# Patient Record
Sex: MALE | Race: BLACK OR AFRICAN AMERICAN | NOT HISPANIC OR LATINO | Employment: FULL TIME | URBAN - METROPOLITAN AREA
[De-identification: names, ages, dates, MRNs, and addresses within clinical notes are randomized per-mention and may not be internally consistent; named-entity substitution may affect disease eponyms.]

---

## 2018-09-06 ENCOUNTER — HOSPITAL ENCOUNTER (EMERGENCY)
Facility: CLINIC | Age: 23
Discharge: HOME | End: 2018-09-06
Attending: EMERGENCY MEDICINE | Admitting: EMERGENCY MEDICINE
Payer: COMMERCIAL

## 2018-09-06 ENCOUNTER — APPOINTMENT (OUTPATIENT)
Dept: CT IMAGING | Facility: CLINIC | Age: 23
End: 2018-09-06
Attending: EMERGENCY MEDICINE
Payer: COMMERCIAL

## 2018-09-06 VITALS
SYSTOLIC BLOOD PRESSURE: 119 MMHG | RESPIRATION RATE: 14 BRPM | TEMPERATURE: 98.9 F | WEIGHT: 156 LBS | BODY MASS INDEX: 21.84 KG/M2 | OXYGEN SATURATION: 100 % | HEIGHT: 71 IN | DIASTOLIC BLOOD PRESSURE: 93 MMHG

## 2018-09-06 DIAGNOSIS — N39.0 URINARY TRACT INFECTION WITHOUT HEMATURIA, SITE UNSPECIFIED: ICD-10-CM

## 2018-09-06 LAB
ALBUMIN SERPL-MCNC: 4.2 G/DL (ref 3.4–5)
ALBUMIN UR-MCNC: 30 MG/DL
ALP SERPL-CCNC: 169 U/L (ref 40–150)
ALT SERPL W P-5'-P-CCNC: 20 U/L (ref 0–70)
ANION GAP SERPL CALCULATED.3IONS-SCNC: 6 MMOL/L (ref 3–14)
APPEARANCE UR: CLEAR
AST SERPL W P-5'-P-CCNC: 23 U/L (ref 0–45)
BASOPHILS # BLD AUTO: 0 10E9/L (ref 0–0.2)
BASOPHILS NFR BLD AUTO: 0.4 %
BILIRUB SERPL-MCNC: 0.9 MG/DL (ref 0.2–1.3)
BILIRUB UR QL STRIP: NEGATIVE
BUN SERPL-MCNC: 10 MG/DL (ref 7–30)
CALCIUM SERPL-MCNC: 9 MG/DL (ref 8.5–10.1)
CHLORIDE SERPL-SCNC: 106 MMOL/L (ref 94–109)
CO2 SERPL-SCNC: 27 MMOL/L (ref 20–32)
COLOR UR AUTO: YELLOW
CREAT SERPL-MCNC: 0.84 MG/DL (ref 0.66–1.25)
DIFFERENTIAL METHOD BLD: NORMAL
EOSINOPHIL # BLD AUTO: 0.1 10E9/L (ref 0–0.7)
EOSINOPHIL NFR BLD AUTO: 1.6 %
ERYTHROCYTE [DISTWIDTH] IN BLOOD BY AUTOMATED COUNT: 12.3 % (ref 10–15)
GFR SERPL CREATININE-BSD FRML MDRD: >90 ML/MIN/1.7M2
GLUCOSE SERPL-MCNC: 89 MG/DL (ref 70–99)
GLUCOSE UR STRIP-MCNC: NEGATIVE MG/DL
HCT VFR BLD AUTO: 43.6 % (ref 40–53)
HGB BLD-MCNC: 15.5 G/DL (ref 13.3–17.7)
HGB UR QL STRIP: NEGATIVE
IMM GRANULOCYTES # BLD: 0 10E9/L (ref 0–0.4)
IMM GRANULOCYTES NFR BLD: 0.2 %
KETONES UR STRIP-MCNC: NEGATIVE MG/DL
LEUKOCYTE ESTERASE UR QL STRIP: ABNORMAL
LIPASE SERPL-CCNC: 214 U/L (ref 73–393)
LYMPHOCYTES # BLD AUTO: 2.2 10E9/L (ref 0.8–5.3)
LYMPHOCYTES NFR BLD AUTO: 43 %
MCH RBC QN AUTO: 32.8 PG (ref 26.5–33)
MCHC RBC AUTO-ENTMCNC: 35.6 G/DL (ref 31.5–36.5)
MCV RBC AUTO: 92 FL (ref 78–100)
MONOCYTES # BLD AUTO: 0.5 10E9/L (ref 0–1.3)
MONOCYTES NFR BLD AUTO: 9.9 %
MUCOUS THREADS #/AREA URNS LPF: PRESENT /LPF
NEUTROPHILS # BLD AUTO: 2.3 10E9/L (ref 1.6–8.3)
NEUTROPHILS NFR BLD AUTO: 44.9 %
NITRATE UR QL: NEGATIVE
NRBC # BLD AUTO: 0 10*3/UL
NRBC BLD AUTO-RTO: 0 /100
PH UR STRIP: 6 PH (ref 5–7)
PLATELET # BLD AUTO: 256 10E9/L (ref 150–450)
POTASSIUM SERPL-SCNC: 3.9 MMOL/L (ref 3.4–5.3)
PROT SERPL-MCNC: 8.1 G/DL (ref 6.8–8.8)
RBC # BLD AUTO: 4.73 10E12/L (ref 4.4–5.9)
RBC #/AREA URNS AUTO: 1 /HPF (ref 0–2)
SODIUM SERPL-SCNC: 139 MMOL/L (ref 133–144)
SOURCE: ABNORMAL
SP GR UR STRIP: 1.02 (ref 1–1.03)
SPERM #/AREA URNS HPF: PRESENT /HPF
UROBILINOGEN UR STRIP-MCNC: NORMAL MG/DL (ref 0–2)
WBC # BLD AUTO: 5.2 10E9/L (ref 4–11)
WBC #/AREA URNS AUTO: 22 /HPF (ref 0–5)

## 2018-09-06 PROCEDURE — 74176 CT ABD & PELVIS W/O CONTRAST: CPT

## 2018-09-06 PROCEDURE — 85025 COMPLETE CBC W/AUTO DIFF WBC: CPT | Performed by: EMERGENCY MEDICINE

## 2018-09-06 PROCEDURE — 96375 TX/PRO/DX INJ NEW DRUG ADDON: CPT

## 2018-09-06 PROCEDURE — 96365 THER/PROPH/DIAG IV INF INIT: CPT

## 2018-09-06 PROCEDURE — 25000128 H RX IP 250 OP 636: Performed by: EMERGENCY MEDICINE

## 2018-09-06 PROCEDURE — 81001 URINALYSIS AUTO W/SCOPE: CPT | Performed by: EMERGENCY MEDICINE

## 2018-09-06 PROCEDURE — 99285 EMERGENCY DEPT VISIT HI MDM: CPT | Mod: 25

## 2018-09-06 PROCEDURE — 80053 COMPREHEN METABOLIC PANEL: CPT | Performed by: EMERGENCY MEDICINE

## 2018-09-06 PROCEDURE — 25000125 ZZHC RX 250: Performed by: EMERGENCY MEDICINE

## 2018-09-06 PROCEDURE — 83690 ASSAY OF LIPASE: CPT | Performed by: EMERGENCY MEDICINE

## 2018-09-06 PROCEDURE — 87086 URINE CULTURE/COLONY COUNT: CPT | Performed by: EMERGENCY MEDICINE

## 2018-09-06 RX ORDER — KETOROLAC TROMETHAMINE 15 MG/ML
15 INJECTION, SOLUTION INTRAMUSCULAR; INTRAVENOUS ONCE
Status: COMPLETED | OUTPATIENT
Start: 2018-09-06 | End: 2018-09-06

## 2018-09-06 RX ORDER — CEFUROXIME AXETIL 500 MG/1
500 TABLET ORAL 2 TIMES DAILY
Qty: 14 TABLET | Refills: 0 | Status: SHIPPED | OUTPATIENT
Start: 2018-09-06

## 2018-09-06 RX ORDER — CEFTRIAXONE 1 G/1
1 INJECTION, POWDER, FOR SOLUTION INTRAMUSCULAR; INTRAVENOUS ONCE
Status: COMPLETED | OUTPATIENT
Start: 2018-09-06 | End: 2018-09-06

## 2018-09-06 RX ADMIN — KETOROLAC TROMETHAMINE 15 MG: 15 INJECTION, SOLUTION INTRAMUSCULAR; INTRAVENOUS at 08:55

## 2018-09-06 RX ADMIN — CEFTRIAXONE SODIUM 1 G: 1 INJECTION, POWDER, FOR SOLUTION INTRAMUSCULAR; INTRAVENOUS at 10:39

## 2018-09-06 RX ADMIN — PANTOPRAZOLE SODIUM 40 MG: 40 INJECTION, POWDER, FOR SOLUTION INTRAVENOUS at 08:58

## 2018-09-06 ASSESSMENT — ENCOUNTER SYMPTOMS
DYSURIA: 0
NAUSEA: 0
HEMATURIA: 0
SHORTNESS OF BREATH: 0
COUGH: 0
CHILLS: 0
HEADACHES: 1
FREQUENCY: 0
FEVER: 0
VOMITING: 0
APPETITE CHANGE: 0
CONSTIPATION: 0
ABDOMINAL PAIN: 1
DIARRHEA: 0

## 2018-09-06 NOTE — ED AVS SNAPSHOT
Emergency Department    [address] [place] [address]    Phone:  [phone]    Fax:  [phone]                                       Doreen Mcpherson   MRN: [id number]    Department:   Emergency Department   Date of Visit:  9/6/2018           After Visit Summary Signature Page     I have received my discharge instructions, and my questions have been answered. I have discussed any challenges I see with this plan with the nurse or doctor.    ..........................................................................................................................................  Patient/Patient Representative Signature      ..........................................................................................................................................  Patient Representative Print Name and Relationship to Patient    ..................................................               ................................................  Date                                            Time    ..........................................................................................................................................  Reviewed by Signature/Title    ...................................................              ..............................................  Date                                                            Time          22EPIC Rev 08/18

## 2018-09-06 NOTE — ED PROVIDER NOTES
History     Chief Complaint:  Abdominal Pain    HPI   Doreen Mcpherson is an otherwise healthy 23 year old male who presents with abdominal pain. Over the past few months, the patient reports he has been intermittently experiencing left-sided abdominal pain. He also endorses an associated headache, and notes his pain became significantly worse last night, so he took Advil, which did not alleviate the pain. He denies any exacerbation with eating. He states he was unable to sleep last night and felt a sensation similar to that of a needle poking into the left side of his back this morning, so he came to the ED for further evaluation. On presentation to the ED, the patient continues to endorse left-sided abdominal pain and a headache, but denies any nausea, vomiting, urinary symptoms including hematuria, bowel changes including diarrhea (last bowel movement yesterday), appetite change, sore throat, cough, chest pain, shortness of breath, or other acute symptoms. He denies any personal or family history of Crohn's colitis.    Allergies:  No known drug allergies    Medications:    The patient is not currently taking any prescribed medications.    Past Medical History:    The patient does not have any past pertinent medical history.    Past Surgical History:    History reviewed. No pertinent surgical history.    Family History:    History reviewed. No pertinent family history.     Social History:  Smoking status: No  Alcohol use: No  Marital Status: Single [1]     Review of Systems   Constitutional: Negative for appetite change, chills and fever.   Respiratory: Negative for cough and shortness of breath.    Cardiovascular: Negative for chest pain.   Gastrointestinal: Positive for abdominal pain. Negative for constipation, diarrhea, nausea and vomiting.   Genitourinary: Negative for dysuria, frequency, hematuria and urgency.   Neurological: Positive for headaches.   All other systems reviewed and are negative.    Physical Exam  "  Patient Vitals for the past 24 hrs:   BP Temp Temp src Heart Rate Resp SpO2 Height Weight   09/06/18 0823 (!) 119/93 98.9  F (37.2  C) Oral 50 14 100 % 1.803 m (5' 11\") 70.8 kg (156 lb)     Physical Exam  Constitutional: Black male, supine.  HENT: No signs of trauma.   Eyes: EOM are normal. Pupils are equal, round, and reactive to light.   Neck: Normal range of motion. No JVD present. No cervical adenopathy.  Cardiovascular: Regular rhythm.  Exam reveals no gallop and no friction rub.    No murmur heard.  Pulmonary/Chest: Bilateral breath sounds normal. No wheezes, rhonchi or rales.  Abdominal: Soft. No tenderness. No rebound or guarding. 2+ femoral pulses. No CVA tenderness.  Musculoskeletal: No edema. No tenderness.   Lymphadenopathy: No lymphadenopathy.   Neurological: Alert and oriented to person, place, and time. Normal strength. Coordination normal.   Skin: Skin is warm and dry. No rash noted. No erythema.     Emergency Department Course   Imaging:  Radiographic findings were communicated with the patient who voiced understanding of the findings.    CT Abdomen Pelvis w/o contrast:  No acute abnormality in the abdomen or pelvis. No cause  for abdominal pain is identified.    As read by Radiology.    Laboratory:  CBC: WBC 5.2, HGB 15.5,   CMP: Alkaline phosphatase 169, Creatinine 0.84  Lipase: 214  UA: Protein albumin 30, moderate leukocyte esterase, WBC 22, mucous present, sperm present, o/w negative  Urine culture: In process    Interventions:  0855: 15 mg Toradol IV  0858: 40 mg Protonix IV  1039: 1 g Ceftriaxone IV    Emergency Department Course:  Past medical records, nursing notes, and vitals reviewed.  0829: I performed an exam of the patient and obtained history, as documented above.  IV inserted and blood drawn. UA performed, results above.  The patient was sent for a CT abdomen pelvis while in the emergency department, findings above.    1021: I rechecked the patient. Explained findings to the " patient.    I rechecked the patient. Findings and plan explained to the patient. Patient discharged home with instructions regarding supportive care, medications, and reasons to return. The importance of close follow-up was reviewed.     Impression & Plan    Medical Decision Making:  Doreen Mcpherson is a 23 year old male who presents to the ED for evaluation of left-sided flank pain off and on for the last four months. He denies urinary symptoms, nausea, vomiting, fevers, chills, and has had no diarrhea. His pain was worse again today, so he came to the ED. He states he took Advil for this once.There is no history of Crohn's colitis or ulcers. On examination, there is minimal if any tenderness to palpation. He has a normal male genital-urinary exam. CT was obtained to rule out a stone, and this returned negative. Blood-work is unremarkable. Urine, though, has significant white cells. This will be cultured, and I will empirically start him on 1 g Rocephin and Ceftin BID for one week. As his urine will be cultured, he should follow-up with primary care on Monday. I have referred him to Dr. Smiley. Otherwise he can be rechecked in the ED for increased pain, fever, or persistent vomiting.    Diagnosis:    ICD-10-CM   1. Urinary tract infection without hematuria, site unspecified N39.0     Disposition: Discharged to home    Discharge Medications:  New Prescriptions    CEFUROXIME (CEFTIN) 500 MG TABLET    Take 1 tablet (500 mg) by mouth 2 times daily     Funmilayo Bragg  9/6/2018    EMERGENCY DEPARTMENT    I, Funmilayo Bragg, ankit serving as a scribe at 8:29 AM on 9/6/2018 to document services personally performed by Javi Luna MD based on my observations and the provider's statements to me.      Javi Luna MD  09/06/18 7217

## 2018-09-06 NOTE — DISCHARGE INSTRUCTIONS
"   * BLADDER INFECTION: Male (Adult)    A bladder infection (\"cystitis\" or \"UTI\") usually causes a constant urge to urinate, and a burning when passing urine. Urine may be cloudy, smelly or dark. There may be also be pain in the lower abdomen.  Cystitis in males is not common. It may be caused by a partial blockage in the urinary system that keeps the bladder from emptying completely. This is most often related to an enlarged prostate gland.  HOME CARE:  1. Drink lots of fluids (at least 6-8 glasses a day). This will flush the bacteria out of your bladder. Cranberry juice has been shown to help clear out the bacteria.  2. Avoid sexual intercourse until your symptoms are gone.  3. A bladder infection is treated with antibiotics. You may also be given Pyridium (generic - phenazopyridine) to reduce burning with urination. This will cause urine to become a bright orange color, which can stain clothing.  FOLLOW UP with your doctor or this facility if ALL symptoms have not cleared within five days. It is important to keep your follow up appointment to discuss with your doctor the need for further tests of the urinary tract.  GET PROMPT MEDICAL ATTENTION if any of the following occur:    Fever over 101  F (38.3  C)    No improvement by the third day of treatment    Increasing back or abdominal pain    Repeated vomiting; unable to keep medicine down    Weakness, dizziness or fainting    0726-4263 [employer]. [address] [place] [address]. All rights reserved. This information is not intended as a substitute for professional medical care. Always follow your healthcare professional's instructions.  This information has been modified by your health care provider with permission from the publisher.      Understanding Urinary Tract Infections (UTIs)  Most UTIs are caused by bacteria, although they may also be caused by viruses or fungi. Bacteria from the bowel are the most common source of " infection. The infection may start because of any of the following:    Sexual activity. During sex, bacteria can travel from the penis, vagina, or rectum into the urethra.     Bacteria on the skin outside the rectum may travel into the urethra. This is more common in women since the rectum and urethra are closer to each other than in men. Wiping from front to back after using the toilet and keeping the area clean can help prevent germs from getting to the urethra.    Blockage of urine flow through the urinary tract. If urine sits too long, germs may start to grow out of control.      Parts of the urinary tract  The infection can occur in any part of the urinary tract.    The kidneys collect and store urine.    The ureters carry urine from the kidneys to the bladder.    The bladder holds urine until you are ready to let it out.    The urethra carries urine from the bladder out of the body. It is shorter in women, so bacteria can move through it more easily. The urethra is longer in men, so a UTI is less likely to reach the bladder or kidneys in men.  Date Last Reviewed: 1/1/2017 2000-2017 [employer] [address] [place] [address]. All rights reserved. This information is not intended as a substitute for professional medical care. Always follow your healthcare professional's instructions.

## 2018-09-06 NOTE — ED AVS SNAPSHOT
"  Emergency Department    [address] [facility] [address]    Phone:  [phone]    Fax:  [phone]                                       Doreen Mcpherson   MRN: [id number]    Department:   Emergency Department   Date of Visit:  9/6/2018           Patient Information     Date Of Birth          1995        Your diagnoses for this visit were:     Urinary tract infection without hematuria, site unspecified        You were seen by Javi Luna MD.      Follow-up Information     Schedule an appointment as soon as possible for a visit with Marino Smiley MD.    Specialty:  Internal Medicine    Contact information:    [address] [facility] [place] [address] [phone]          Discharge Instructions          * BLADDER INFECTION: Male (Adult)    A bladder infection (\"cystitis\" or \"UTI\") usually causes a constant urge to urinate, and a burning when passing urine. Urine may be cloudy, smelly or dark. There may be also be pain in the lower abdomen.  Cystitis in males is not common. It may be caused by a partial blockage in the urinary system that keeps the bladder from emptying completely. This is most often related to an enlarged prostate gland.  HOME CARE:  1. Drink lots of fluids (at least 6-8 glasses a day). This will flush the bacteria out of your bladder. Cranberry juice has been shown to help clear out the bacteria.  2. Avoid sexual intercourse until your symptoms are gone.  3. A bladder infection is treated with antibiotics. You may also be given Pyridium (generic - phenazopyridine) to reduce burning with urination. This will cause urine to become a bright orange color, which can stain clothing.  FOLLOW UP with your doctor or this facility if ALL symptoms have not cleared within five days. It is important to keep your follow up appointment to discuss with your doctor the need for further tests of the urinary tract.  GET PROMPT MEDICAL ATTENTION if any of the following " occur:    Fever over 101  F (38.3  C)    No improvement by the third day of treatment    Increasing back or abdominal pain    Repeated vomiting; unable to keep medicine down    Weakness, dizziness or fainting    5715-0180 [employer]. [address] [place] [address]. All rights reserved. This information is not intended as a substitute for professional medical care. Always follow your healthcare professional's instructions.  This information has been modified by your health care provider with permission from the publisher.      Understanding Urinary Tract Infections (UTIs)  Most UTIs are caused by bacteria, although they may also be caused by viruses or fungi. Bacteria from the bowel are the most common source of infection. The infection may start because of any of the following:    Sexual activity. During sex, bacteria can travel from the penis, vagina, or rectum into the urethra.     Bacteria on the skin outside the rectum may travel into the urethra. This is more common in women since the rectum and urethra are closer to each other than in men. Wiping from front to back after using the toilet and keeping the area clean can help prevent germs from getting to the urethra.    Blockage of urine flow through the urinary tract. If urine sits too long, germs may start to grow out of control.      Parts of the urinary tract  The infection can occur in any part of the urinary tract.    The kidneys collect and store urine.    The ureters carry urine from the kidneys to the bladder.    The bladder holds urine until you are ready to let it out.    The urethra carries urine from the bladder out of the body. It is shorter in women, so bacteria can move through it more easily. The urethra is longer in men, so a UTI is less likely to reach the bladder or kidneys in men.  Date Last Reviewed: 1/1/2017 2000-2017 [employer]. [address] [place] [address]. All rights reserved.  This information is not intended as a substitute for professional medical care. Always follow your healthcare professional's instructions.          24 Hour Appointment Hotline       To make an appointment at any [facility], call [phone] ([phone]). If you don't have a family doctor or clinic, we will help you find one. [facility] clinics are conveniently located to serve the needs of you and your family.             Review of your medicines      START taking        Dose / Directions Last dose taken    cefuroxime 500 MG tablet   Commonly known as:  CEFTIN   Dose:  500 mg   Quantity:  14 tablet        Take 1 tablet (500 mg) by mouth 2 times daily   Refills:  0          Our records show that you are taking the medicines listed below. If these are incorrect, please call your family doctor or clinic.        Dose / Directions Last dose taken    HYDROcodone-acetaminophen 5-325 MG per tablet   Commonly known as:  NORCO   Dose:  1-2 tablet   Quantity:  15 tablet        Take 1-2 tablets by mouth every 4 hours as needed for moderate to severe pain   Refills:  0                Prescriptions were sent or printed at these locations (1 Prescription)                   Other Prescriptions                Printed at Department/Unit printer (1 of 1)         cefuroxime (CEFTIN) 500 MG tablet                Procedures and tests performed during your visit     CBC with platelets differential    CT Abdomen Pelvis without Contrast (stone protocol)    Comprehensive metabolic panel    Lipase    UA with Microscopic reflex to Culture    Urine Culture Aerobic Bacterial      Orders Needing Specimen Collection     None      Pending Results     Date and Time Order Name Status Description    9/6/2018 0905 Urine Culture Aerobic Bacterial In process             Pending Culture Results     Date and Time Order Name Status Description    9/6/2018 0905 Urine Culture Aerobic Bacterial In process             Pending Results Instructions      If you had any lab results that were not finalized at the time of your Discharge, you can call the ED Lab Result RN at [phone]. You will be contacted by this team for any positive Lab results or changes in treatment. The nurses are available 7 days a week from 10A to 6:30P.  You can leave a message 24 hours per day and they will return your call.        Test Results From Your Hospital Stay        9/6/2018  9:00 AM      Component Results     Component Value Ref Range & Units Status    WBC 5.2 4.0 - 11.0 10e9/L Final    RBC Count 4.73 4.4 - 5.9 10e12/L Final    Hemoglobin 15.5 13.3 - 17.7 g/dL Final    Hematocrit 43.6 40.0 - 53.0 % Final    MCV 92 78 - 100 fl Final    MCH 32.8 26.5 - 33.0 pg Final    MCHC 35.6 31.5 - 36.5 g/dL Final    RDW 12.3 10.0 - 15.0 % Final    Platelet Count 256 150 - 450 10e9/L Final    Diff Method Automated Method  Final    % Neutrophils 44.9 % Final    % Lymphocytes 43.0 % Final    % Monocytes 9.9 % Final    % Eosinophils 1.6 % Final    % Basophils 0.4 % Final    % Immature Granulocytes 0.2 % Final    Nucleated RBCs 0 0 /100 Final    Absolute Neutrophil 2.3 1.6 - 8.3 10e9/L Final    Absolute Lymphocytes 2.2 0.8 - 5.3 10e9/L Final    Absolute Monocytes 0.5 0.0 - 1.3 10e9/L Final    Absolute Eosinophils 0.1 0.0 - 0.7 10e9/L Final    Absolute Basophils 0.0 0.0 - 0.2 10e9/L Final    Abs Immature Granulocytes 0.0 0 - 0.4 10e9/L Final    Absolute Nucleated RBC 0.0  Final         9/6/2018  9:23 AM      Component Results     Component Value Ref Range & Units Status    Sodium 139 133 - 144 mmol/L Final    Potassium 3.9 3.4 - 5.3 mmol/L Final    Chloride 106 94 - 109 mmol/L Final    Carbon Dioxide 27 20 - 32 mmol/L Final    Anion Gap 6 3 - 14 mmol/L Final    Glucose 89 70 - 99 mg/dL Final    Urea Nitrogen 10 7 - 30 mg/dL Final    Creatinine 0.84 0.66 - 1.25 mg/dL Final    GFR Estimate >90 >60 mL/min/1.7m2 Final    Non  GFR Calc    GFR Estimate If Black >90 >60 mL/min/1.7m2 Final      GFR Calc    Calcium 9.0 8.5 - 10.1 mg/dL Final    Bilirubin Total 0.9 0.2 - 1.3 mg/dL Final    Albumin 4.2 3.4 - 5.0 g/dL Final    Protein Total 8.1 6.8 - 8.8 g/dL Final    Alkaline Phosphatase 169 (H) 40 - 150 U/L Final    ALT 20 0 - 70 U/L Final    AST 23 0 - 45 U/L Final         9/6/2018  9:21 AM      Component Results     Component Value Ref Range & Units Status    Lipase 214 73 - 393 U/L Final         9/6/2018  9:25 AM      Component Results     Component Value Ref Range & Units Status    Color Urine Yellow  Final    Appearance Urine Clear  Final    Glucose Urine Negative NEG^Negative mg/dL Final    Bilirubin Urine Negative NEG^Negative Final    Ketones Urine Negative NEG^Negative mg/dL Final    Specific Gravity Urine 1.021 1.003 - 1.035 Final    Blood Urine Negative NEG^Negative Final    pH Urine 6.0 5.0 - 7.0 pH Final    Protein Albumin Urine 30 (A) NEG^Negative mg/dL Final    Urobilinogen mg/dL Normal 0.0 - 2.0 mg/dL Final    Nitrite Urine Negative NEG^Negative Final    Leukocyte Esterase Urine Moderate (A) NEG^Negative Final    Source Midstream Urine  Final    WBC Urine 22 (H) 0 - 5 /HPF Final    RBC Urine 1 0 - 2 /HPF Final    Mucous Urine Present (A) NEG^Negative /LPF Final    sperm Present (A) NEG^Negative /HPF Final         9/6/2018  9:52 AM      Narrative     CT ABDOMEN/PELVIS WITHOUT CONTRAST September 6, 2018 9:37 AM     HISTORY: Abdominal pain.     TECHNIQUE: Volumetric helical sections were acquired from the lung  bases through the ischial tuberosities without IV contrast. Coronal  images were also reconstructed. Radiation dose for this scan was  reduced using automated exposure control, adjustment of the mA and/or  kV according to patient size, or iterative reconstruction technique.    COMPARISON: None.    FINDINGS: The liver, gallbladder, spleen, adrenal glands, pancreas,  and kidneys have unremarkable noncontrast appearances. No urinary  calculi or hydronephrosis. No bowel  obstruction. Unremarkable  appendix. No convincing evidence for colitis or diverticulitis. No  free fluid in the pelvis. The visualized lung bases are clear.          Impression     IMPRESSION: No acute abnormality in the abdomen or pelvis. No cause  for abdominal pain is identified.            MELANIE OJEDA MD         9/6/2018  9:27 AM                Clinical Quality Measure: Blood Pressure Screening     Your blood pressure was checked while you were in the emergency department today. The last reading we obtained was  BP: (!) 119/93 . Please read the guidelines below about what these numbers mean and what you should do about them.  If your systolic blood pressure (the top number) is less than 120 and your diastolic blood pressure (the bottom number) is less than 80, then your blood pressure is normal. There is nothing more that you need to do about it.  If your systolic blood pressure (the top number) is 120-139 or your diastolic blood pressure (the bottom number) is 80-89, your blood pressure may be higher than it should be. You should have your blood pressure rechecked within a year by a primary care provider.  If your systolic blood pressure (the top number) is 140 or greater or your diastolic blood pressure (the bottom number) is 90 or greater, you may have high blood pressure. High blood pressure is treatable, but if left untreated over time it can put you at risk for heart attack, stroke, or kidney failure. You should have your blood pressure rechecked by a primary care provider within the next 4 weeks.  If your provider in the emergency department today gave you specific instructions to follow-up with your doctor or provider even sooner than that, you should follow that instruction and not wait for up to 4 weeks for your follow-up visit.        Thank you for choosing [facility]       Thank you for choosing [facility] for your care. Our goal is always to provide you with excellent care. Hearing back from our  "patients is one way we can continue to improve our services. Please take a few minutes to complete the written survey that you may receive in the mail after you visit with us. Thank you!        [employer] Information     [employer] lets you send messages to your doctor, view your test results, renew your prescriptions, schedule appointments and more. To sign up, go to [email or web address] . Click on \"Log in\" on the left side of the screen, which will take you to the Welcome page. Then click on \"Sign up Now\" on the right side of the page.     You will be asked to enter the access code listed below, as well as some personal information. Please follow the directions to create your username and password.     Your access code is: [email or web address]  Expires: 2018 11:20 AM     Your access code will  in 90 days. If you need help or a new code, please call your [facility] clinic or [phone].        Care EveryWhere ID     This is your Care EveryWhere ID. This could be used by other organizations to access your [facility] medical records  [id number]        Equal Access to Services     ALFONSO SU : Hadana Schreiber, chelsea johnson, paola rosen, alivia carson . [facility] [phone].    ATENCIÓN: Si habla español, tiene a ramirez disposición servicios gratuitos de asistencia lingüística. Nanda al [phone].    We comply with applicable federal civil rights laws and [place] laws. We do not discriminate on the basis of race, color, national origin, age, disability, sex, sexual orientation, or gender identity.            After Visit Summary       This is your record. Keep this with you and show to your community pharmacist(s) and doctor(s) at your next visit.                  "

## 2018-09-06 NOTE — LETTER
September 6, 2018      To Whom It May Concern:      Doreen Mcpherson was seen in our Emergency Department today, 09/06/18.  I expect his condition to improve over the next day.  He may return to work/school when improved.    Sincerely,        Maryann ESPINO RN

## 2018-09-07 LAB
BACTERIA SPEC CULT: NORMAL
Lab: NORMAL
SPECIMEN SOURCE: NORMAL

## 2020-06-01 ENCOUNTER — TELEPHONE (OUTPATIENT)
Dept: URGENT CARE | Facility: CLINIC | Age: 25
End: 2020-06-01
Payer: COMMERCIAL

## 2020-06-01 DIAGNOSIS — R05.9 COUGH: Primary | ICD-10-CM

## 2020-06-01 PROCEDURE — 99202 OFFICE O/P NEW SF 15 MIN: CPT | Mod: 95 | Performed by: FAMILY MEDICINE

## 2020-06-01 NOTE — TELEPHONE ENCOUNTER
Cough present for a year worse as of late exposed to his siter who tested positive    No fever. No trouble breathing.     ? Asthma- no better with inhalers    flonase-no helping    GERD-next step if flonase does not help.     No other symptoms.     OBJECTIVE: There were no vitals taken for this visit.   No increased work of breathing appreciated over the phone.     Normal mental status.       ICD-10-CM    1. Cough  R05 Symptomatic COVID-19 Virus (Coronavirus) by PCR    testing idicated due to symptoms and occupation. Order placed. He cristela grider to schedule if the schedulers do not call him. Time of visit > 6 minutes greater than half in counseling and coordination of care.

## 2020-06-03 DIAGNOSIS — R05.9 COUGH: ICD-10-CM

## 2020-06-03 LAB
SARS-COV-2 RNA SPEC QL NAA+PROBE: NOT DETECTED
SPECIMEN SOURCE: NORMAL

## 2020-06-03 PROCEDURE — 99000 SPECIMEN HANDLING OFFICE-LAB: CPT | Performed by: FAMILY MEDICINE

## 2020-09-10 ENCOUNTER — HOSPITAL ENCOUNTER (OUTPATIENT)
Dept: SPEECH THERAPY | Facility: CLINIC | Age: 25
Setting detail: THERAPIES SERIES
End: 2020-09-10
Payer: COMMERCIAL

## 2020-09-10 PROCEDURE — 92524 BEHAVRAL QUALIT ANALYS VOICE: CPT | Mod: GN | Performed by: STUDENT IN AN ORGANIZED HEALTH CARE EDUCATION/TRAINING PROGRAM

## 2020-09-10 PROCEDURE — 92507 TX SP LANG VOICE COMM INDIV: CPT | Mod: GN | Performed by: STUDENT IN AN ORGANIZED HEALTH CARE EDUCATION/TRAINING PROGRAM

## 2020-10-20 NOTE — PROGRESS NOTES
[facility]          OUTPATIENT SPEECH LANGUAGE PATHOLOGY VOICE EVALUATION  PLAN OF TREATMENT FOR OUTPATIENT REHABILITATION  (COMPLETE FOR INITIAL CLAIMS ONLY)    Patient's Last Name, First Name, M.I.  YOB: 1995  KyDoreen                           Provider s Name: [facility] Medical Record No.  [id number]     Onset Date:  8/18/2020 (order date)    Start of Care Date:  9/10/2020   Type:     ___PT  __OT   _X_SLP    Medical Diagnosis: Dysphonia   Speech Language Pathology Diagnosis:  Dysphonia, Muscle tension dysphonia    Visits from SOC: 1      _________________________________________________________________________________  Plan of Treatment/Functional Goals:  Voice         Goals     1. Goal Identifier: Generalization       Goal Description: Patient will report a week of typical activities in which dysphonia and vocal effort do not exceed a level of 2 out of 10, 90% of the time, so that patient is able to meet his vocal demands at work and at home.       Target Date: 12/09/20   2. Goal Identifier: Voice quality       Goal Description: In a 20-minute speech task, patient will demonstrate roughness, breathiness, and strain that do not exceed a level of 2 out of 10, 90% of the time by SLP judgment, so that patient is able to meet his voice quality demands.       Target Date: 12/09/20   3. Goal Identifier: Massage       Goal Description: Patient will learn, demonstrate, and implement use of circumlaryngeal massage exercises independently 1-2x per day, every other day, in order to promote reduced laryngeal discomfort and tension.       Target Date: 12/09/20   4. Goal Identifier: Hygiene       Goal Description: Patient will learn, demonstrate, and implement use of 2-3 vocal hygiene strategies (e.g. hydration, reflux management, cough reduction), to promote reduced laryngeal irritation.         Target Date: 12/09/20                     Frequency and Duration: 1x/week for 6 weeks with 1-2 monthly follow-ups  Allison E. Alpers, SLP       I CERTIFY THE NEED FOR THESE SERVICES FURNISHED UNDER        THIS PLAN OF TREATMENT AND WHILE UNDER MY CARE .             Physician Signature               Date    X_____________________________________________________                      Certification Date From:  09/10/20  Certification Date To:  12/09/20  Referring Provider: David Graham MD                 Initial Assessment        See Epic Evaluation Start of Care

## 2020-10-20 NOTE — PROGRESS NOTES
[facility] OP SLP Voice Evaluation  09/10/20 1011   General Information   Type Of Visit Initial   Start Of Care Date 09/10/20   Referring Physician David Graham MD  (ENT)   Orders Evaluate And Treat   Orders Comment Muslce tension dysphonia   Medical Diagnosis Dysphonia   Onset Of Illness/injury Or Date Of Surgery 08/18/20  (order date)   Precautions/Limitations  no known precautions/limitations   Hearing WFL for 1:1 conversation in session   Surgical/Medical history reviewed Yes   Pertinent History Of Current Problem 26yo male presenting with dysphonia and chronic cough since October 2019.  Symptoms initially began with a URI but did not resolve with the rest of the illness.  Pt reports that his voice is low and hoarse.  His voice becomes more effortful to produce the more he talks, and his throat will start to hurt.  Talking on the phone and projecting his voice are particularly difficult.  He coughs throughout the day, noting that it feels like something is stuck in his throat that he has to clear, but nothing ever comes up.  Pt tries not to cough, but will still have coughing bursts.  Drinking water helps with the cough, but nothing helps his voice.  His symptoms have been a little better recently, but worsen whenever he has to wear a face covering.  Pt is also taking reflux medication and cough syrup with codeine.  He reports that reflux medications have helped with his heartburn symptoms, but have not improved his voice or the cough.  He denies difficulty with swallowing or breathing.  Please see chart for additional PMH.   Prior Level of Functioning No previous problems.   Patient Role/employment History Employed  (Respiratory Therapist at [employer])   General Observations Pt reports that today is a typical voice day   Patient/family Goals To have a normal voice, to reduce the cough as much as possible   Evaluation Results   Voice Observations COUGH/THROAT CLEAR: 2  instances of dry coughs observed, with small throat clearing also observed throughout the session.  Locus of cough/throat clear sounds consistent with upper airway.  PALPATION OF THYROHYOID REGION: firm musculature with closure of the thyrohyoid space on phonation.  Pt reports tenderness of the thyrohyoid region and BOT musculature on palpation.   Voice Profile during conversation, 1 min monologue and paragraph reading   Voice Quality Raspy;Breathy;Hoarse;Scratchy   Voice quality comments SPEECH: Consistent mild-moderate breathiness and strain with intermittent mild roughness.  SINGING: strained at lower pitches, but less breathy and rough overall.  THERAPY PROBES: good improvement in voice quality with flow mode, forward resonance, and semi-occluded vocal tract probes.   Voice quality severity rating continuum (1=Severe, 7=WNL) 5  (CAPE-V Overall Severity: 36/100)   Breath control Tight   Breath Control comments Excessive thoracic muscle use pattern on inspiration for speech.  Inspirations for speech are occasionally shallow, with poor respiratory/phonatory coordination.   Breath control severity rating continuum (1=Severe, 7=WNL) 5   Voice Use / Effort Pinched / squeezed larynx;Contraction of neck muscles;Throat push   Voice Use / Effort comments Pt rates his current phonatory effort for speech as 3/10 (10 is maximum effort), noting that effort increases to 6/10 when trying to project his voice.   Voice use / Effort severity rating continuum (1=Severe, 7=WNL) 5   Fundamental frequency (Hz)   (Centered around C3-C#3)   Pitch /Frequency Description WNL   Pitch / Frequency comments Habitual pitch is WFL and appropriate, although increased dysphonia is observed at low pitches.   Pitch / Frequency severity rating continuum (1=Severe, 7=WNL) 6   Volume comments Volume for conversational speech is WFL and appropriate.  A whisper is normal.  Soft phonation is mildly breathy.  Loud phonation is strained with some neck  "involvement, but with good volume increase.   Volume severity rating continuum (1=Severe, 7=WNL) 6   Neuromuscular Control WNL   Neuromuscular Control severity rating continuum (1=Severe, 7=WNL) 7   Resonance WNL   Resonance severity rating continuum (1=Severe, 7=WNL) 7   Comments Mild-moderate dysphonia characterized by roughness, breathiness, strain, poor respiratory/phonatory coordination, elevated S/Z ratio, and increased phonatory effort with tight laryngeal musculature on phonation.   Adduction /Abduction Function   Laryngeal diadokinetic speed   (WNL)   Laryngeal diadokinetic strength   (Strained)   Laryngeal diadokinetic consistency Regular;Run together  (Intermittently run together)   Adduction / Abduction function scale Age 11 - 65, norm per sec:  5+   Vibratory Function of Vocal Folds   Prolonged 'ah' at mid pitch (sec) 18.4 seconds at C#3 with consistent mild breathiness and intermittent mild roughness   Prolonged 'ah' at high pitch (sec) 12.1 seconds at C#4 with increased strain and mild breathiness   Vibratory Function of Vocal Folds Scale Males 20 - 80 yrs: 15 - 25 secs   Vibratory Function of Vocal Folds Comments Reduced in duration at high pitch, reduced in quality overall   Mucosal Function of the Vocal Folds   S/Z ratio (__ sec/ __sec = __percentage) 1.6  (>1.4 is suggestive of laryngeal pathology)   Function of Lengtheners / Shorteners (CT and TA Muscles)   Pitch glides Upper register present;Lower pitches more dysphonic  (Lowest: C#3; Highest: G5)   Respiratory Function Screening   Longest prolonged \"S\" from S/Z ratio (#of sec) 32.6 seconds   Videostroboscopy / Endoscopy   Other observations Laryngoscopy completed by Dr. Graham, significant for muscle tension dysphonia and inflammation.   General Therapy Interventions   Planned Therapy Interventions Voice   Voice Breath flow to sound flow;Throat clearing elimination plan;Voice quality/pitch or volume tasks;Resonant voice techniques   Impressions " and Recommendations   Communication Diagnosis Dysphonia, Muscle tension dysphonia   Summary Mr. Mcpherson presents with mild-moderate dysphonia characterized by roughness, breathiness, strain, poor respiratory/phonatory coordination, elevated S/Z ratio, and increased phonatory effort with tight laryngeal musculature on phonation.  Based on today's evaluation and previous laryngoscopy with ENT, dysphonia is primarily accounted for by hyperfunction and/or imbalance in function of the intrinsic and extrinsic laryngeal musculature consistent with a muscle tension dysphonia in the context of low grade laryngeal irritation from reflux and chronic cough.   Recommendations A course of skilled speech therapy is recommended in order to optimize vocal technique, improve voice quality, and promote reduced laryngeal effort, fatigue, discomfort, and irritation, so that patient is able to meet his daily vocal demands at work and at home.   Frequency and Duration 1x/week for 6 weeks with 1-2 monthly follow-ups   Prognosis  Good with intervention   Risks and Benefits of Treatment have been explained. Yes   Patient & /or Caregiver  in agreement with plan of care Yes   Patient Education SLP provided education regarding evaluation findings and proposed POC.  Therapy initiated today.   Educational Assessment   Barriers to Learning No barriers   Preferred Learning Style Listening;Reading;Demonstration;Pictures / Video   Voice Goals   Voice Goals 1;2;3;4   Voice Goal 1   Goal Identifier Generalization   Goal Description Patient will report a week of typical activities in which dysphonia and vocal effort do not exceed a level of 2 out of 10, 90% of the time, so that patient is able to meet his vocal demands at work and at home.   Target Date 12/09/20   Voice Goal 2   Goal Identifier Voice quality   Goal Description In a 20-minute speech task, patient will demonstrate roughness, breathiness, and strain that do not exceed a level of 2 out of 10,  90% of the time by SLP judgment, so that patient is able to meet his voice quality demands.   Target Date 12/09/20   Voice Goal 3   Goal Identifier Massage   Goal Description Patient will learn, demonstrate, and implement use of circumlaryngeal massage exercises independently 1-2x per day, every other day, in order to promote reduced laryngeal discomfort and tension.   Target Date 12/09/20   Voice Goal 4   Goal Identifier Hygiene   Goal Description Patient will learn, demonstrate, and implement use of 2-3 vocal hygiene strategies (e.g. hydration, reflux management, cough reduction), to promote reduced laryngeal irritation.    Target Date 12/09/20   Total Session Time   Voice Minutes (32098) 30   Total Evaluation Time 45   Therapy Certification   Certification date from 09/10/20   Certification date to 12/09/20   Medical Diagnosis Dysphonia     Thank you for the referral of this patient.    Allison Alpers, B.A. (gabi) MARINA, CCC-SLP  Speech-Language Pathologist  Certificate of Vocology  [facility]  [phone]

## 2023-04-22 ENCOUNTER — HEALTH MAINTENANCE LETTER (OUTPATIENT)
Age: 28
End: 2023-04-22

## 2023-05-24 ENCOUNTER — HOSPITAL ENCOUNTER (EMERGENCY)
Facility: CLINIC | Age: 28
Discharge: HOME | End: 2023-05-25
Attending: EMERGENCY MEDICINE | Admitting: EMERGENCY MEDICINE
Payer: COMMERCIAL

## 2023-05-24 VITALS
OXYGEN SATURATION: 97 % | DIASTOLIC BLOOD PRESSURE: 84 MMHG | TEMPERATURE: 97.9 F | WEIGHT: 162 LBS | SYSTOLIC BLOOD PRESSURE: 130 MMHG | HEART RATE: 85 BPM | HEIGHT: 71 IN | BODY MASS INDEX: 22.68 KG/M2 | RESPIRATION RATE: 18 BRPM

## 2023-05-24 DIAGNOSIS — S01.81XA FACIAL LACERATION, INITIAL ENCOUNTER: ICD-10-CM

## 2023-05-24 PROCEDURE — 250N000009 HC RX 250: Performed by: EMERGENCY MEDICINE

## 2023-05-24 PROCEDURE — 250N000013 HC RX MED GY IP 250 OP 250 PS 637: Performed by: EMERGENCY MEDICINE

## 2023-05-24 PROCEDURE — 99284 EMERGENCY DEPT VISIT MOD MDM: CPT | Mod: 25

## 2023-05-24 PROCEDURE — 12052 INTMD RPR FACE/MM 2.6-5.0 CM: CPT

## 2023-05-24 RX ORDER — ACETAMINOPHEN 500 MG
1000 TABLET ORAL ONCE
Status: COMPLETED | OUTPATIENT
Start: 2023-05-24 | End: 2023-05-24

## 2023-05-24 RX ADMIN — Medication 3 ML: at 23:24

## 2023-05-24 RX ADMIN — ACETAMINOPHEN 1000 MG: 500 TABLET ORAL at 22:13

## 2023-05-25 PROCEDURE — 250N000009 HC RX 250: Performed by: EMERGENCY MEDICINE

## 2023-05-25 RX ADMIN — Medication 3 ML: at 00:57

## 2023-05-25 ASSESSMENT — ACTIVITIES OF DAILY LIVING (ADL): ADLS_ACUITY_SCORE: 33

## 2023-05-25 NOTE — ED PROVIDER NOTES
"  History     Chief Complaint:  Facial Laceration       HPI   Doreen Mcpherson is a 28 year old male who presents with laceration above right eyelid and below right eyebrow. Patient was playing basketball and hit his head against another players head around 2140 tonight. He did not loss of consciousness but did feel light headed after the accident. He does no have pain with eye movement. Denies medical history and drug allergies. Last tetanus vaccination was 2022.       Independent Historian:   None - Patient Only      Medications:    Norco    Past Medical History:    Mild reactive airways disease  GERD    Past Surgical History:    No past surgical history.    Physical Exam     Patient Vitals for the past 24 hrs:   BP Temp Temp src Pulse Resp SpO2 Height Weight   05/24/23 2207 130/84 97.9  F (36.6  C) Temporal 85 18 97 % 1.803 m (5' 11\") 73.5 kg (162 lb)        Physical Exam  General:  Alert, nontoxic in appearance  Head:  2.4 cm laceration just below right eyebrow.  No surrounding bony tenderness or deformities.  Eyes:  EOMI.  PERRL  CV:  Appears well perfused  Lungs:  No obvious respiratory distress  Neuro:  Speaking clearly, no slurred speech  MSK:  Ambulatory      Emergency Department Course     Procedures     Laceration Repair      Procedure: Laceration Repair    Indication: Laceration    Consent: Verbal    Location: Right eyebrow    Length: 2.4 cm    Preparation: Irrigation with Sterile Saline.    Anesthesia/Sedation: Topical -LET      Treatment/Exploration: Wound explored, no foreign bodies found     Closure: The wound was closed with two layers. Skin/superficial layer was closed with 3 x 5-0 Fast gut absorbable  using Interrupted sutures. Subcutaneous layer was closed with 4 x 5-0 Vicryl using Interrupted sutures.     Patient Status: The patient tolerated the procedure well: Yes. There were no complications.       Emergency Department Course & Assessments:  Interventions:  Medications "   lido-EPINEPHrine-tetracaine (LET) topical gel GEL (3 mLs Topical $Given 5/25/23 0057)   acetaminophen (TYLENOL) tablet 1,000 mg (1,000 mg Oral $Given 5/24/23 2213)        Assessments:  0033 I obtained history and examined the patient as noted above.   0122 I rechecked the patient and explained findings.     Independent Interpretation (X-rays, CTs, rhythm strip):  None      Social Determinants of Health affecting care:   None    Disposition:  The patient was discharged to home.     Impression & Plan    Medical Decision Making:  Doreen Mcpherson Is a 28-year-old gentleman presents due to a laceration to his right eyebrow region.  He was playing basketball and struck heads with another player causing a laceration.  There is no signs of bony injury and no pain with extraocular movements.  There is no loss of consciousness.  Wound was thoroughly cleaned and sutured closed with good effect.  Supportive care and return precautions were discussed.      Diagnosis:    ICD-10-CM    1. Facial laceration, initial encounter  S01.81XA            Discharge Medications:  New Prescriptions    No medications on file          Satish Staton  5/25/2023   Grant Lyon MD [place]stal, John A, MD  05/29/23 0136

## 2023-05-25 NOTE — ED TRIAGE NOTES
Patient presents with laceration just above right eyelid.  He was playing basketball when him and another player hit heads.  He denies LOC or changes to vision.     Triage Assessment     Row Name 05/24/23 8153       Triage Assessment (Adult)    Airway WDL WDL       Respiratory WDL    Respiratory WDL WDL       Skin Circulation/Temperature WDL    Skin Circulation/Temperature WDL WDL       Cardiac WDL    Cardiac WDL WDL       Peripheral/Neurovascular WDL    Peripheral Neurovascular WDL WDL       Cognitive/Neuro/Behavioral WDL    Cognitive/Neuro/Behavioral WDL WDL

## 2024-06-23 ENCOUNTER — HEALTH MAINTENANCE LETTER (OUTPATIENT)
Age: 29
End: 2024-06-23

## 2025-04-05 ENCOUNTER — HOSPITAL ENCOUNTER (EMERGENCY)
Facility: CLINIC | Age: 30
Discharge: HOME | End: 2025-04-05
Attending: FAMILY MEDICINE | Admitting: FAMILY MEDICINE
Payer: COMMERCIAL

## 2025-04-05 VITALS
OXYGEN SATURATION: 100 % | RESPIRATION RATE: 18 BRPM | HEART RATE: 76 BPM | TEMPERATURE: 98.4 F | SYSTOLIC BLOOD PRESSURE: 132 MMHG | DIASTOLIC BLOOD PRESSURE: 92 MMHG

## 2025-04-05 DIAGNOSIS — S61.012A LACERATION OF LEFT THUMB WITHOUT FOREIGN BODY WITHOUT DAMAGE TO NAIL, INITIAL ENCOUNTER: ICD-10-CM

## 2025-04-05 DIAGNOSIS — S61.011A LACERATION OF RIGHT THUMB WITHOUT FOREIGN BODY WITHOUT DAMAGE TO NAIL, INITIAL ENCOUNTER: ICD-10-CM

## 2025-04-05 PROCEDURE — 99282 EMERGENCY DEPT VISIT SF MDM: CPT | Performed by: FAMILY MEDICINE

## 2025-04-05 PROCEDURE — 99283 EMERGENCY DEPT VISIT LOW MDM: CPT | Performed by: FAMILY MEDICINE

## 2025-04-05 ASSESSMENT — COLUMBIA-SUICIDE SEVERITY RATING SCALE - C-SSRS
6. HAVE YOU EVER DONE ANYTHING, STARTED TO DO ANYTHING, OR PREPARED TO DO ANYTHING TO END YOUR LIFE?: NO
2. HAVE YOU ACTUALLY HAD ANY THOUGHTS OF KILLING YOURSELF IN THE PAST MONTH?: NO
1. IN THE PAST MONTH, HAVE YOU WISHED YOU WERE DEAD OR WISHED YOU COULD GO TO SLEEP AND NOT WAKE UP?: NO

## 2025-04-05 NOTE — ED TRIAGE NOTES
Triage Assessment (Adult)       Row Name 04/05/25 1502          Triage Assessment    Airway WDL WDL        Respiratory WDL    Respiratory WDL WDL        Skin Circulation/Temperature WDL    Skin Circulation/Temperature WDL WDL        Cardiac WDL    Cardiac WDL WDL        Peripheral/Neurovascular WDL    Peripheral Neurovascular WDL WDL        Cognitive/Neuro/Behavioral WDL    Cognitive/Neuro/Behavioral WDL WDL

## 2025-04-05 NOTE — ED PROVIDER NOTES
ED Provider Note  [facility]      History     Chief Complaint   Patient presents with    Foreign Body in Skin     Pt reports cutting both thumbs from trying to open a glass container injuring both thumbs and think there is a piece of glass in skin.     HPI  Doreen Mcpherson is a 29 year old male who presents to the ED for evaluation of foreign body. The patient is currently employed here. He states that he was taking a food tray containing a glass dish to a patient today. He believes the glass dish had been overheated and when he went to remove the wrapper from the dish the glass broke, causing a laceration to his left thumb also 2 smaller lacerations to the right thumb. He states that there is possibly still a piece of glass in the skin of his left thumb.  His last tetanus immunization was in 2022.  No numbness or tingling    Past Medical History  No past medical history on file.  No past surgical history on file.  cefuroxime (CEFTIN) 500 MG tablet  HYDROcodone-acetaminophen (NORCO) 5-325 MG per tablet      No Known Allergies  Family History  No family history on file.  Social History   Social History     Tobacco Use    Smoking status: Never    Smokeless tobacco: Never   Substance Use Topics    Alcohol use: No    Drug use: No      A medically appropriate review of systems was performed with pertinent positives and negatives noted in the HPI, and all other systems negative.    Physical Exam   BP: (!) 132/92  Pulse: 76  Temp: 98.4  F (36.9  C)  Resp: 18  SpO2: 100 %  Physical Exam  Vitals and nursing note reviewed.   Constitutional:       General: He is not in acute distress.     Appearance: Normal appearance.   HENT:      Head: Normocephalic and atraumatic.   Eyes:      Pupils: Pupils are equal, round, and reactive to light.   Musculoskeletal:         General: Signs of injury present.        Hands:    Skin:     General: Skin is warm and dry.   Neurological:      Mental Status: He is alert.            ED Course, Procedures, & Data      Procedures                No results found for any visits on 04/05/25.  Medications - No data to display  Labs Ordered and Resulted from Time of ED Arrival to Time of ED Departure - No data to display  No orders to display          Critical care was not performed.     Medical Decision Making  The patient's presentation was of straightforward complexity (a clearly self-limited or minor problem).    The patient's evaluation involved:  history and exam without other MDM data elements    The patient's management necessitated only low risk treatment.    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  On exam there is no residual glass foreign body on his hands, his lacerations are quite superficial and would not require sutures, adhesive or specialized wound care.  The wounds were washed copiously, examined carefully using magnifier, and then dressings were placed.  His tetanus immunization is up-to-date.  Patient is okay to resume regular activities while keeping the wounds clean dry and covered.  We discussed the indications for emergency department return and follow-up.  Stable for discharge.      I have reviewed the nursing notes. I have reviewed the findings, diagnosis, plan and need for follow up with the patient.    New Prescriptions    No medications on file       Final diagnoses:   Laceration of right thumb without foreign body without damage to nail, initial encounter   Laceration of left thumb without foreign body without damage to nail, initial encounter     I, Sobeida Zee, am serving as a trained medical scribe to document services personally performed by Maxi Olvera MD, based on the provider's statements to me.     I, Maxi Olvera MD, was physically present and have reviewed and verified the accuracy of  this note documented by Sobeida Zee.      Maxi Olvera MD   [facility] EMERGENCY DEPARTMENT  4/5/2025     Maxi Olvera MD  04/05/25 1521

## 2025-04-05 NOTE — ED TRIAGE NOTES
Pt reports cutting both thumbs from trying to open a glass container injuring both thumbs and think there is a piece of glass in skin.    Triage Assessment (Adult)       Row Name 04/05/25 1501          Triage Assessment    Airway WDL WDL        Respiratory WDL    Respiratory WDL WDL        Skin Circulation/Temperature WDL    Skin Circulation/Temperature WDL WDL        Cardiac WDL    Cardiac WDL WDL        Peripheral/Neurovascular WDL    Peripheral Neurovascular WDL WDL        Cognitive/Neuro/Behavioral WDL    Cognitive/Neuro/Behavioral WDL WDL

## 2025-04-05 NOTE — LETTER
[facility] EMERGENCY DEPARTMENT  [address]  [place] [address]  [phone]      2025    Doreen Mcpherson  [address]    [facility] [id number] [phone] (home)     : 1995      To Whom it may concern:    Doreen Mcpherson was seen in our Emergency Department today, 2025 for an injury that was reported to be work related.      The patient is okay to return to work.  He should keep his lacerations clean dry and covered while working, no other restrictions.    Sincerely,    Maxi Olvera MD    Electronically signed

## 2025-04-05 NOTE — DISCHARGE INSTRUCTIONS
Okay to resume normal activities, keep the lacerations clean dry and covered until completely healed.  Tylenol as needed

## 2025-07-12 ENCOUNTER — HEALTH MAINTENANCE LETTER (OUTPATIENT)
Age: 30
End: 2025-07-12